# Patient Record
Sex: MALE | Race: WHITE | NOT HISPANIC OR LATINO
[De-identification: names, ages, dates, MRNs, and addresses within clinical notes are randomized per-mention and may not be internally consistent; named-entity substitution may affect disease eponyms.]

---

## 2021-02-24 ENCOUNTER — APPOINTMENT (OUTPATIENT)
Dept: OPHTHALMOLOGY | Facility: CLINIC | Age: 86
End: 2021-02-24
Payer: COMMERCIAL

## 2021-02-24 ENCOUNTER — NON-APPOINTMENT (OUTPATIENT)
Age: 86
End: 2021-02-24

## 2021-02-24 PROCEDURE — 92134 CPTRZ OPH DX IMG PST SGM RTA: CPT

## 2021-02-24 PROCEDURE — 99072 ADDL SUPL MATRL&STAF TM PHE: CPT

## 2021-02-24 PROCEDURE — 92014 COMPRE OPH EXAM EST PT 1/>: CPT

## 2021-08-21 ENCOUNTER — TRANSCRIPTION ENCOUNTER (OUTPATIENT)
Age: 86
End: 2021-08-21

## 2022-09-14 ENCOUNTER — RESULT REVIEW (OUTPATIENT)
Age: 87
End: 2022-09-14

## 2022-10-12 ENCOUNTER — APPOINTMENT (OUTPATIENT)
Dept: OPHTHALMOLOGY | Facility: CLINIC | Age: 87
End: 2022-10-12

## 2022-10-12 ENCOUNTER — NON-APPOINTMENT (OUTPATIENT)
Age: 87
End: 2022-10-12

## 2022-10-12 PROCEDURE — 92012 INTRM OPH EXAM EST PATIENT: CPT

## 2022-12-05 ENCOUNTER — APPOINTMENT (OUTPATIENT)
Dept: OPHTHALMOLOGY | Facility: CLINIC | Age: 87
End: 2022-12-05

## 2022-12-05 ENCOUNTER — NON-APPOINTMENT (OUTPATIENT)
Age: 87
End: 2022-12-05

## 2022-12-05 PROCEDURE — 92012 INTRM OPH EXAM EST PATIENT: CPT

## 2023-03-01 ENCOUNTER — APPOINTMENT (OUTPATIENT)
Dept: OPHTHALMOLOGY | Facility: CLINIC | Age: 88
End: 2023-03-01
Payer: COMMERCIAL

## 2023-03-01 ENCOUNTER — NON-APPOINTMENT (OUTPATIENT)
Age: 88
End: 2023-03-01

## 2023-03-01 PROCEDURE — 92134 CPTRZ OPH DX IMG PST SGM RTA: CPT

## 2023-03-01 PROCEDURE — 92014 COMPRE OPH EXAM EST PT 1/>: CPT

## 2023-04-04 PROBLEM — Z00.00 ENCOUNTER FOR PREVENTIVE HEALTH EXAMINATION: Status: ACTIVE | Noted: 2023-04-04

## 2024-06-05 ENCOUNTER — NON-APPOINTMENT (OUTPATIENT)
Age: 89
End: 2024-06-05

## 2024-06-05 ENCOUNTER — APPOINTMENT (OUTPATIENT)
Dept: OPHTHALMOLOGY | Facility: CLINIC | Age: 89
End: 2024-06-05
Payer: COMMERCIAL

## 2024-06-05 PROCEDURE — 92014 COMPRE OPH EXAM EST PT 1/>: CPT

## 2024-06-05 PROCEDURE — 92133 CPTRZD OPH DX IMG PST SGM ON: CPT

## 2024-06-17 ENCOUNTER — EMERGENCY (EMERGENCY)
Facility: HOSPITAL | Age: 89
LOS: 1 days | Discharge: HOME | End: 2024-06-17
Attending: EMERGENCY MEDICINE
Payer: COMMERCIAL

## 2024-06-17 VITALS
DIASTOLIC BLOOD PRESSURE: 77 MMHG | HEIGHT: 63 IN | HEART RATE: 76 BPM | OXYGEN SATURATION: 98 % | TEMPERATURE: 98 F | WEIGHT: 160.06 LBS | SYSTOLIC BLOOD PRESSURE: 149 MMHG | RESPIRATION RATE: 16 BRPM

## 2024-06-17 LAB
ALBUMIN SERPL ELPH-MCNC: 4.2 G/DL — SIGNIFICANT CHANGE UP (ref 3.3–5)
ALP SERPL-CCNC: 79 U/L — SIGNIFICANT CHANGE UP (ref 40–120)
ALT FLD-CCNC: 21 U/L — SIGNIFICANT CHANGE UP (ref 10–45)
ANION GAP SERPL CALC-SCNC: 13 MMOL/L — SIGNIFICANT CHANGE UP (ref 5–17)
APTT BLD: 30.4 SEC — SIGNIFICANT CHANGE UP (ref 24.5–35.6)
AST SERPL-CCNC: 29 U/L — SIGNIFICANT CHANGE UP (ref 10–40)
BASOPHILS # BLD AUTO: 0.04 K/UL — SIGNIFICANT CHANGE UP (ref 0–0.2)
BASOPHILS NFR BLD AUTO: 0.6 % — SIGNIFICANT CHANGE UP (ref 0–2)
BILIRUB SERPL-MCNC: 0.5 MG/DL — SIGNIFICANT CHANGE UP (ref 0.2–1.2)
BUN SERPL-MCNC: 14 MG/DL — SIGNIFICANT CHANGE UP (ref 7–23)
CALCIUM SERPL-MCNC: 9.5 MG/DL — SIGNIFICANT CHANGE UP (ref 8.4–10.5)
CHLORIDE SERPL-SCNC: 95 MMOL/L — LOW (ref 96–108)
CO2 SERPL-SCNC: 24 MMOL/L — SIGNIFICANT CHANGE UP (ref 22–31)
CREAT SERPL-MCNC: 1.15 MG/DL — SIGNIFICANT CHANGE UP (ref 0.5–1.3)
EGFR: 62 ML/MIN/1.73M2 — SIGNIFICANT CHANGE UP
EOSINOPHIL # BLD AUTO: 0.09 K/UL — SIGNIFICANT CHANGE UP (ref 0–0.5)
EOSINOPHIL NFR BLD AUTO: 1.4 % — SIGNIFICANT CHANGE UP (ref 0–6)
GLUCOSE SERPL-MCNC: 107 MG/DL — HIGH (ref 70–99)
HCT VFR BLD CALC: 42.1 % — SIGNIFICANT CHANGE UP (ref 39–50)
HGB BLD-MCNC: 14 G/DL — SIGNIFICANT CHANGE UP (ref 13–17)
IMM GRANULOCYTES NFR BLD AUTO: 0.5 % — SIGNIFICANT CHANGE UP (ref 0–0.9)
INR BLD: 0.96 RATIO — SIGNIFICANT CHANGE UP (ref 0.85–1.18)
LYMPHOCYTES # BLD AUTO: 1.83 K/UL — SIGNIFICANT CHANGE UP (ref 1–3.3)
LYMPHOCYTES # BLD AUTO: 27.5 % — SIGNIFICANT CHANGE UP (ref 13–44)
MCHC RBC-ENTMCNC: 27.8 PG — SIGNIFICANT CHANGE UP (ref 27–34)
MCHC RBC-ENTMCNC: 33.3 GM/DL — SIGNIFICANT CHANGE UP (ref 32–36)
MCV RBC AUTO: 83.7 FL — SIGNIFICANT CHANGE UP (ref 80–100)
MONOCYTES # BLD AUTO: 0.71 K/UL — SIGNIFICANT CHANGE UP (ref 0–0.9)
MONOCYTES NFR BLD AUTO: 10.7 % — SIGNIFICANT CHANGE UP (ref 2–14)
NEUTROPHILS # BLD AUTO: 3.96 K/UL — SIGNIFICANT CHANGE UP (ref 1.8–7.4)
NEUTROPHILS NFR BLD AUTO: 59.3 % — SIGNIFICANT CHANGE UP (ref 43–77)
NRBC # BLD: 0 /100 WBCS — SIGNIFICANT CHANGE UP (ref 0–0)
PLATELET # BLD AUTO: 187 K/UL — SIGNIFICANT CHANGE UP (ref 150–400)
POTASSIUM SERPL-MCNC: 3.4 MMOL/L — LOW (ref 3.5–5.3)
POTASSIUM SERPL-SCNC: 3.4 MMOL/L — LOW (ref 3.5–5.3)
PROT SERPL-MCNC: 6.9 G/DL — SIGNIFICANT CHANGE UP (ref 6–8.3)
PROTHROM AB SERPL-ACNC: 10.6 SEC — SIGNIFICANT CHANGE UP (ref 9.5–13)
RBC # BLD: 5.03 M/UL — SIGNIFICANT CHANGE UP (ref 4.2–5.8)
RBC # FLD: 13.7 % — SIGNIFICANT CHANGE UP (ref 10.3–14.5)
SODIUM SERPL-SCNC: 132 MMOL/L — LOW (ref 135–145)
TROPONIN T, HIGH SENSITIVITY RESULT: 21 NG/L — SIGNIFICANT CHANGE UP (ref 0–51)
TROPONIN T, HIGH SENSITIVITY RESULT: 21 NG/L — SIGNIFICANT CHANGE UP (ref 0–51)
WBC # BLD: 6.66 K/UL — SIGNIFICANT CHANGE UP (ref 3.8–10.5)
WBC # FLD AUTO: 6.66 K/UL — SIGNIFICANT CHANGE UP (ref 3.8–10.5)

## 2024-06-17 PROCEDURE — 70544 MR ANGIOGRAPHY HEAD W/O DYE: CPT | Mod: 26

## 2024-06-17 PROCEDURE — 72125 CT NECK SPINE W/O DYE: CPT | Mod: 26,MC

## 2024-06-17 PROCEDURE — 70553 MRI BRAIN STEM W/O & W/DYE: CPT | Mod: 26,MC

## 2024-06-17 PROCEDURE — 72128 CT CHEST SPINE W/O DYE: CPT | Mod: 26,MC

## 2024-06-17 PROCEDURE — 99291 CRITICAL CARE FIRST HOUR: CPT

## 2024-06-17 PROCEDURE — 70450 CT HEAD/BRAIN W/O DYE: CPT | Mod: 26,MC

## 2024-06-17 PROCEDURE — 70548 MR ANGIOGRAPHY NECK W/DYE: CPT | Mod: 26

## 2024-06-17 PROCEDURE — 71250 CT THORAX DX C-: CPT | Mod: 26,MC

## 2024-06-17 PROCEDURE — 99283 EMERGENCY DEPT VISIT LOW MDM: CPT

## 2024-06-17 PROCEDURE — 70450 CT HEAD/BRAIN W/O DYE: CPT | Mod: 26,MC,77

## 2024-06-17 RX ORDER — LIDOCAINE 4 G/100G
1 CREAM TOPICAL ONCE
Refills: 0 | Status: COMPLETED | OUTPATIENT
Start: 2024-06-17 | End: 2024-06-17

## 2024-06-17 RX ORDER — FAMOTIDINE 10 MG/ML
20 INJECTION INTRAVENOUS DAILY
Refills: 0 | Status: DISCONTINUED | OUTPATIENT
Start: 2024-06-17 | End: 2024-06-20

## 2024-06-17 RX ORDER — LEVOTHYROXINE SODIUM 125 MCG
50 TABLET ORAL DAILY
Refills: 0 | Status: DISCONTINUED | OUTPATIENT
Start: 2024-06-17 | End: 2024-06-20

## 2024-06-17 RX ORDER — ACETAMINOPHEN 500 MG
975 TABLET ORAL ONCE
Refills: 0 | Status: COMPLETED | OUTPATIENT
Start: 2024-06-17 | End: 2024-06-17

## 2024-06-17 RX ORDER — SPIRONOLACTONE 25 MG/1
25 TABLET, FILM COATED ORAL DAILY
Refills: 0 | Status: DISCONTINUED | OUTPATIENT
Start: 2024-06-17 | End: 2024-06-18

## 2024-06-17 RX ORDER — ACETAMINOPHEN 500 MG
650 TABLET ORAL ONCE
Refills: 0 | Status: COMPLETED | OUTPATIENT
Start: 2024-06-17 | End: 2024-06-17

## 2024-06-17 RX ORDER — DIAZEPAM 5 MG
2 TABLET ORAL ONCE
Refills: 0 | Status: DISCONTINUED | OUTPATIENT
Start: 2024-06-17 | End: 2024-06-17

## 2024-06-17 RX ORDER — LOSARTAN POTASSIUM 100 MG/1
50 TABLET, FILM COATED ORAL DAILY
Refills: 0 | Status: DISCONTINUED | OUTPATIENT
Start: 2024-06-17 | End: 2024-06-20

## 2024-06-17 RX ORDER — LEVETIRACETAM 250 MG/1
500 TABLET, FILM COATED ORAL
Refills: 0 | Status: DISCONTINUED | OUTPATIENT
Start: 2024-06-17 | End: 2024-06-20

## 2024-06-17 RX ADMIN — Medication 2 MILLIGRAM(S): at 14:32

## 2024-06-17 RX ADMIN — FAMOTIDINE 20 MILLIGRAM(S): 10 INJECTION INTRAVENOUS at 22:33

## 2024-06-17 RX ADMIN — SPIRONOLACTONE 25 MILLIGRAM(S): 25 TABLET, FILM COATED ORAL at 22:33

## 2024-06-17 RX ADMIN — LIDOCAINE 1 PATCH: 4 CREAM TOPICAL at 11:57

## 2024-06-17 RX ADMIN — LEVETIRACETAM 500 MILLIGRAM(S): 250 TABLET, FILM COATED ORAL at 11:29

## 2024-06-17 RX ADMIN — Medication 650 MILLIGRAM(S): at 23:40

## 2024-06-17 RX ADMIN — LEVETIRACETAM 500 MILLIGRAM(S): 250 TABLET, FILM COATED ORAL at 22:33

## 2024-06-17 RX ADMIN — LIDOCAINE 1 PATCH: 4 CREAM TOPICAL at 19:56

## 2024-06-17 RX ADMIN — Medication 975 MILLIGRAM(S): at 11:58

## 2024-06-17 RX ADMIN — LOSARTAN POTASSIUM 50 MILLIGRAM(S): 100 TABLET, FILM COATED ORAL at 22:33

## 2024-06-17 RX ADMIN — Medication 650 MILLIGRAM(S): at 22:40

## 2024-06-17 RX ADMIN — LIDOCAINE 1 PATCH: 4 CREAM TOPICAL at 21:20

## 2024-06-17 NOTE — ED PROVIDER NOTE - ATTENDING CONTRIBUTION TO CARE
Patient was critically ill with a high probability of imminent or life threatening deterioration.  I have performed direct patient care (not related to procedure), additional history taking, interpretation of diagnostic studies, documentation, consultation with other physicians, telephone consultation with the patient's family  I have personally and independently provided the amount of critical care time documented below excluding time spent on separate procedures.  32 mins    Dr. Berger: I have personally performed a face to face bedside history and physical examination of this patient. I have discussed the history, examination, review of systems, assessment and plan of management with the resident. I have reviewed the electronic medical record and amended it to reflect my history, review of systems, physical exam, assessment and plan.    Dr. Berger: This H&P has been written by myself in its entirety

## 2024-06-17 NOTE — CONSULT NOTE ADULT - ASSESSMENT
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 CTH w/ small posterior R parafalcine aSDH. No mass effect. Coags/plts wnl. Exam: Neuro-intact   -No acute neurosurgical intervention   -4h rpt CTH. If stable, no c/i to discharge from NSGY perspective   -ED keeping in CDU for syncope w/u, MRI   -Keppra 500mg BID x7d   -Outpatient f/u with Dr. Cevallos after d/c

## 2024-06-17 NOTE — ED ADULT NURSE NOTE - NSFALLHARMRISKINTERV_ED_ALL_ED
Assistance OOB with selected safe patient handling equipment if applicable/Communicate risk of Fall with Harm to all staff, patient, and family/Provide visual cue: red socks, yellow wristband, yellow gown, etc/Reinforce activity limits and safety measures with patient and family/Bed in lowest position, wheels locked, appropriate side rails in place/Call bell, personal items and telephone in reach/Instruct patient to call for assistance before getting out of bed/chair/stretcher/Non-slip footwear applied when patient is off stretcher/[place] to call system/Physically safe environment - no spills, clutter or unnecessary equipment/Purposeful Proactive Rounding/Room/bathroom lighting operational, light cord in reach

## 2024-06-17 NOTE — ED CDU PROVIDER DISPOSITION NOTE - PATIENT PORTAL LINK FT
You can access the FollowMyHealth Patient Portal offered by [facility] by registering at the following website: [email or web address]. By joining [facility]’s FollowMyHealth portal, you will also be able to view your health information using other applications (apps) compatible with our system.

## 2024-06-17 NOTE — ED CDU PROVIDER INITIAL DAY NOTE - CLINICAL SUMMARY MEDICAL DECISION MAKING FREE TEXT BOX
Dr. 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  Patient has a history of hypertension, took hydrochlorothiazide this morning.  Has a history of BPPV as well however this does not feel like it.  No chest pain, shortness of breath, nausea, vomiting, abdominal pain. Pt seen on arrival on EMS stretcher.  Gen: No acute distress  	HEENT: Mucous membranes moist, pink conjunctivae, EOMI, left-sided nystagmus noted  	CV: RRR, no clubbing/cyanosis/edema  	Resp: CTAB  	GI: Abdomen soft, NT, ND. Normal BS. No rebound, no guarding  	: No CVAT  	Neuro: A&O x 3, moving all 4 extremities, patient initially unsteady when ambulating however this resolved on its own.  Normal finger-to-nose and heel-to-shin bilaterally.  	MSK: No spine or joint tenderness to palpation  Skin: hematoma noted to the occiput  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  Last known well was 11 PM.  Code stroke called on arrival, taken to head CT, patient states he is anaphylactic to contrast thus just Noncon CT performed.  Discussed with neurology at bedside, recommend CDU for MRI versus outpatient follow-up.  Patient not comfortable with outpatient follow-up for etiology of unsteadiness, will place in CDU for MRI.  Patient also noted to have a small subdural hemorrhage, discussed with neurosurgery at bedside, recommends 4-hour head CT and then can be cleared from neurosurgery standpoint.  Also recommends Keppra 500 mg twice daily for a week and blood pressure can be maintained under 180.  Discussed with CDU BRODIE Rehman, will place in the CDU for MRI. CT head stable.

## 2024-06-17 NOTE — ED CDU PROVIDER INITIAL DAY NOTE - PHYSICAL EXAMINATION
Gen: No acute distress  HEENT: Mucous membranes moist, pink conjunctivae, EOMI.  CV: RRR, no clubbing/cyanosis/edema  Resp: CTAB  GI: Abdomen soft, NT, ND. Normal BS. No rebound, no guarding  : No CVAT  Neuro: A&O x 3, steady gait. No focal deficits.   MSK: Moving all extremities.  Skin: hematoma noted to the occiput

## 2024-06-17 NOTE — ED CDU PROVIDER DISPOSITION NOTE - CLINICAL COURSE
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  Patient has a history of hypertension, took hydrochlorothiazide this morning.  Has a history of BPPV as well however this does not feel like it.  No chest pain, shortness of breath, nausea, vomiting, or abdominal pain.   In ED, pt hypertensive, vitals otherwise stable. Labs nonactionable. CTH + SDH. Patient seen by NSGY recommending repeat 4hr CTH which was stable.  Pt also seen by neuro recommending MRI/MRA for workup of unsteadiness. Plan as above for CDU.   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  Patient has a history of hypertension, took hydrochlorothiazide this morning.  Has a history of BPPV as well however this does not feel like it.  No chest pain, shortness of breath, nausea, vomiting, or abdominal pain.   In ED, pt hypertensive, vitals otherwise stable. Labs nonactionable. CTH + SDH. Patient seen by NSGY recommending repeat 4hr CTH which was stable.  Pt also seen by neuro recommending MRI/MRA for workup of unsteadiness. Plan as above for CDU.   In CDU, pt did well, sdh stable, MRI without emergent/acute findings. pt seen by neuro and neurosurgeon and cleared. pt going home on [facility].

## 2024-06-17 NOTE — ED CDU PROVIDER DISPOSITION NOTE - CARE PROVIDER_API CALL
Yosef Cevallos  Neurosurgery  [address], [address]  [place] [address]  Phone: [phone]  Fax: [phone]  Follow Up Time:

## 2024-06-17 NOTE — ED ADULT NURSE NOTE - OBJECTIVE STATEMENT
PT is 89y M with PMH HTN, complaining of fall. Pt reports onset of "unsteadiness" in gait upon waking up this morning. Able to ambulate to run errands, but reports falling backwards from standing height and + head strike, - LOC. Able to stand after fall, but persistent unsteadiness since. Upon assessment, A&Ox4, approximately 4 cm hematoma and abrasion on R posterior head, PERRLA 2mm, denies chest pain, SOB, abdominal pain, n/v.

## 2024-06-17 NOTE — ED PROVIDER NOTE - OBJECTIVE STATEMENT
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  Patient has a history of hypertension, took hydrochlorothiazide this morning.  Has a history of BPPV as well however this does not feel like it.  No chest pain, shortness of breath, nausea, vomiting, abdominal pain. Pt seen on arrival on EMS stretcher.

## 2024-06-17 NOTE — ED PROVIDER NOTE - FAMILY DETAILS FREE TEXT FOR MDM ADDL HISTORY OBTAINED FROM QUESTION
wife at bedsideGen: No acute distress  HEENT: Mucous membranes moist, pink conjunctivae, EOMI, left-sided nystagmus noted  CV: RRR, no clubbing/cyanosis/edema  Resp: CTAB  GI: Abdomen soft, NT, ND. Normal BS. No rebound, no guarding  : No CVAT  Neuro: A&O x 3, moving all 4 extremities, patient initially unsteady when ambulating however this resolved on its own.  Normal finger-to-nose and heel-to-shin bilaterally.  MSK: No spine or joint tenderness to palpation  Skin: hematoma noted to the occiput wife at bedside

## 2024-06-17 NOTE — ED CDU PROVIDER INITIAL DAY NOTE - OBJECTIVE STATEMENT
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  Patient has a history of hypertension, took hydrochlorothiazide this morning.  Has a history of BPPV as well however this does not feel like it.  No chest pain, shortness of breath, nausea, vomiting, or abdominal pain.   In ED, pt hypertensive, vitals otherwise stable. Labs nonactionable. CTH + SDH. Patient seen by NSGY recommending repeat 4hr CTH which was stable.  Pt also seen by neuro recommending MRI for workup of unsteadiness. Plan as above for CDU.

## 2024-06-17 NOTE — CONSULT NOTE ADULT - SUBJECTIVE AND OBJECTIVE BOX
Neurology - Consult Note    -  Spectra: [phone] ([facility]), [phone] ([facility])  -    HPI: Patient DrIlana BARRAGAN is a 89y (1935) man with a PMHx significant for HTN (well controlled), migraine, BPPV, osteoarthritis, s/p thyroidectomy, ?ITP who presented to the ED for gait unsteadiness and fall. Patient stated yesterday patient had a typical migrainous type R sided headache during the day which resolved and went to bed at neurologial baseline. Upon awakening patient endorsed non specific gait unsteadiness. Denied leaning toward one side vs the other, LH, room spinning dizziness. He proceeded to complete his PT session without difficulty. While waking uphill into his home and holding a package he suffered a fall (fell backwards) with headstrike but no LOC. Fall not preceeded by  LH or room spinning dizziness. No seizure like activity noted. He then presented to the ED and code stroke activated. CTH resulted in small SDH along falx w/o midline shift. CT c spine neg for spinal fracture. Unable to obtain vessel imaging d/t prior anaphylactic rxn to IV contrast.     Patient denied HA, vision or speech changes, weakness, numbness. Feels his gait is baseline. No memory impairment. Feels at neurological baseline. Does endorse back pain (stated he feel on his thoracic spine).   NIHSS: 0  ICH 0   Baseline mRS: 0   Not a tenecteplase candidate due to presentation outside the window and SDH    Patient is a retired Internist as a profession     Review of Systems:    CONSTITUTIONAL: No fevers or chills  EYES AND ENT: No visual changes or no throat pain   NECK: No pain or stiffness  RESPIRATORY: No hemoptysis or shortness of breath  CARDIOVASCULAR: No chest pain or palpitations  GASTROINTESTINAL: No melena or hematochezia  GENITOURINARY: No dysuria or hematuria  NEUROLOGICAL: +As stated in HPI above  SKIN: No itching, burning, rashes, or lesions   All other review of systems is negative unless indicated above.    Allergies:  Motrin (Rash)      PMHx/PSHx/Family Hx: As above, otherwise see below   HTN (hypertension)        Social Hx:  No reported use of tobacco, alcohol, or illicit drugs    Medications:  MEDICATIONS  (STANDING):  levETIRAcetam 500 milliGRAM(s) Oral two times a day    MEDICATIONS  (PRN):        Home Medications:      Vitals:  T(C): 36.7 (06-17-24 @ 11:00), Max: 36.7 (06-17-24 @ 10:40)  HR: 71 (06-17-24 @ 11:00) (70 - 76)  BP: 158/105 (06-17-24 @ 11:00) (149/77 - 164/81)  RR: 16 (06-17-24 @ 11:00) (16 - 20)  SpO2: 98% (06-17-24 @ 11:00) (98% - 98%)    Physical Examination:    General - NAD  Cardiovascular - Peripheral pulses palpable, no edema    Neurologic Exam:  Mental status - Awake, Alert, Oriented to person, place, and time. Speech fluent, repetition and naming intact. Follows simple and complex commands. attention, concentration and fund of knowledge intact.     Cranial nerves:  CN II: Visual fields are full to confrontation. Pupils are 3 mm and briskly reactive to light.   CN III, IV, VI: EOMI, no nystagmus, no ptosis  CN V: Facial sensation is intact to pinprick in all 3 divisions bilaterally.  CN VII: Face is symmetric with normal eye closure and smile.  CN VII: Hearing is normal to rubbing fingers  CN IX, X: Palate elevates symmetrically. Phonation is normal.  CN XI: Head turning and shoulder shrug are intact  CN XII: Tongue is midline with normal movements and no atrophy.    Motor - Normal bulk and tone throughout. No pronator drift.  Strength testing            Deltoid      Biceps      Triceps     Wrist Extension    Wrist Flexion       R            5                 5               5                     5                              5                        5  L             5                 5               5                     5                              5                       5              Hip Flexion    Hip Extension    Knee Flexion    Knee Extension    Dorsiflexion    Plantar Flexion  R              5                           5                       5                           5                            5                          5  L              5                           5                        5                           5                            5                          5      Sensation - Light touch/temperature intact throughout    DTR's -             Biceps      Triceps     Brachioradialis      Patellar    Ankle    Toes/plantar response  R             2+             2+                  2+                       2+            2+                 Down  L              2+             2+                 2+                        2+           2+                 Down    Coordination - Finger to Nose and heal to shin intact b/l. No tremors appreciated    Gait and station - Normal casual gait. Romberg (-)    Labs:                        14.0   6.66  )-----------( 187      ( 17 Jun 2024 10:08 )             42.1     06-17    132<L>  |  95<L>  |  14  ----------------------------<  107<H>  3.4<L>   |  24  |  1.15    Ca    9.5      17 Jun 2024 10:08    TPro  6.9  /  Alb  4.2  /  TBili  0.5  /  DBili  x   /  AST  29  /  ALT  21  /  AlkPhos  79  06-17    CAPILLARY BLOOD GLUCOSE  108 (17 Jun 2024 11:17)      POCT Blood Glucose.: 108 mg/dL (17 Jun 2024 10:01)    LIVER FUNCTIONS - ( 17 Jun 2024 10:08 )  Alb: 4.2 g/dL / Pro: 6.9 g/dL / ALK PHOS: 79 U/L / ALT: 21 U/L / AST: 29 U/L / GGT: x             PT/INR - ( 17 Jun 2024 10:08 )   PT: 10.6 sec;   INR: 0.96 ratio         PTT - ( 17 Jun 2024 10:08 )  PTT:30.4 sec           Radiology:    < from: CT Cervical Spine No Cont (06.17.24 @ 10:35) >  IMPRESSION:  CT head:  -Acute subdural hematoma measuring up to 0.4 cm in thickness along the   posterior falx. No midline shift.    CT cervical spine:  -No acute fracture or dislocation.    < end of copied text >   Neurology - Consult Note    -  Spectra: [phone] ([facility]), [phone] ([facility])  -    HPI: Patient DrIlana BARRAGAN is a 89y (1935) man with a PMHx significant for HTN (well controlled), migraine, BPPV, osteoarthritis, s/p thyroidectomy, ?ITP who presented to the ED for gait unsteadiness and fall. Patient stated yesterday patient had a typical migrainous type R sided headache during the day which resolved and went to bed at neurologial baseline. Upon awakening patient endorsed non specific gait unsteadiness. Denied leaning toward one side vs the other, LH, room spinning dizziness. He is not on any AC or anti-thrombotics.  He proceeded to complete his PT session without difficulty. While waking uphill into his home and holding a package he suffered a fall (fell backwards) with headstrike but no LOC. Fall not preceeded by  LH or room spinning dizziness. No seizure like activity noted. He then presented to the ED and code stroke activated. CTH resulted in small SDH along falx w/o midline shift. CT c spine neg for spinal fracture. Unable to obtain vessel imaging d/t prior anaphylactic rxn to IV contrast.     Patient denied HA, vision or speech changes, weakness, numbness. Feels his gait is baseline. No memory impairment. Feels at neurological baseline. Does endorse back pain (stated he feel on his thoracic spine).   NIHSS: 0  ICH 0   Baseline mRS: 0   Not a tenecteplase candidate due to presentation outside the window and SDH    Patient is a retired Internist as a profession     Review of Systems:    CONSTITUTIONAL: No fevers or chills  EYES AND ENT: No visual changes or no throat pain   NECK: No pain or stiffness  RESPIRATORY: No hemoptysis or shortness of breath  CARDIOVASCULAR: No chest pain or palpitations  GASTROINTESTINAL: No melena or hematochezia  GENITOURINARY: No dysuria or hematuria  NEUROLOGICAL: +As stated in HPI above  SKIN: No itching, burning, rashes, or lesions   All other review of systems is negative unless indicated above.    Allergies:  Motrin (Rash)      PMHx/PSHx/Family Hx: As above, otherwise see below   HTN (hypertension)        Social Hx:  No reported use of tobacco, alcohol, or illicit drugs    Medications:  MEDICATIONS  (STANDING):  levETIRAcetam 500 milliGRAM(s) Oral two times a day    MEDICATIONS  (PRN):        Home Medications:      Vitals:  T(C): 36.7 (06-17-24 @ 11:00), Max: 36.7 (06-17-24 @ 10:40)  HR: 71 (06-17-24 @ 11:00) (70 - 76)  BP: 158/105 (06-17-24 @ 11:00) (149/77 - 164/81)  RR: 16 (06-17-24 @ 11:00) (16 - 20)  SpO2: 98% (06-17-24 @ 11:00) (98% - 98%)    Physical Examination:    General - NAD  Cardiovascular - Peripheral pulses palpable, no edema    Neurologic Exam:  Mental status - Awake, Alert, Oriented to person, place, and time. Speech fluent, repetition and naming intact. Follows simple and complex commands. attention, concentration and fund of knowledge intact.     Cranial nerves:  CN II: Visual fields are full to confrontation. Pupils are 3 mm and briskly reactive to light.   CN III, IV, VI: EOMI, no nystagmus, no ptosis  CN V: Facial sensation is intact to pinprick in all 3 divisions bilaterally.  CN VII: Face is symmetric with normal eye closure and smile.  CN VII: Hearing is normal to rubbing fingers  CN IX, X: Palate elevates symmetrically. Phonation is normal.  CN XI: Head turning and shoulder shrug are intact  CN XII: Tongue is midline with normal movements and no atrophy.    Motor - Normal bulk and tone throughout. No pronator drift.  Strength testing            Deltoid      Biceps      Triceps     Wrist Extension    Wrist Flexion       R            5                 5               5                     5                              5                        5  L             5                 5               5                     5                              5                       5              Hip Flexion    Hip Extension    Knee Flexion    Knee Extension    Dorsiflexion    Plantar Flexion  R              5                           5                       5                           5                            5                          5  L              5                           5                        5                           5                            5                          5      Sensation - Light touch/temperature intact throughout    DTR's -             Biceps      Triceps     Brachioradialis      Patellar    Ankle    Toes/plantar response  R             2+             2+                  2+                       2+            2+                 Down  L              2+             2+                 2+                        2+           2+                 Down    Coordination - Finger to Nose and heal to shin intact b/l. No tremors appreciated    Gait and station - Normal casual gait. Romberg (-)    Labs:                        14.0   6.66  )-----------( 187      ( 17 Jun 2024 10:08 )             42.1     06-17    132<L>  |  95<L>  |  14  ----------------------------<  107<H>  3.4<L>   |  24  |  1.15    Ca    9.5      17 Jun 2024 10:08    TPro  6.9  /  Alb  4.2  /  TBili  0.5  /  DBili  x   /  AST  29  /  ALT  21  /  AlkPhos  79  06-17    CAPILLARY BLOOD GLUCOSE  108 (17 Jun 2024 11:17)      POCT Blood Glucose.: 108 mg/dL (17 Jun 2024 10:01)    LIVER FUNCTIONS - ( 17 Jun 2024 10:08 )  Alb: 4.2 g/dL / Pro: 6.9 g/dL / ALK PHOS: 79 U/L / ALT: 21 U/L / AST: 29 U/L / GGT: x             PT/INR - ( 17 Jun 2024 10:08 )   PT: 10.6 sec;   INR: 0.96 ratio         PTT - ( 17 Jun 2024 10:08 )  PTT:30.4 sec           Radiology:    < from: CT Cervical Spine No Cont (06.17.24 @ 10:35) >  IMPRESSION:  CT head:  -Acute subdural hematoma measuring up to 0.4 cm in thickness along the   posterior falx. No midline shift.    CT cervical spine:  -No acute fracture or dislocation.    < end of copied text >

## 2024-06-17 NOTE — CONSULT NOTE ADULT - ASSESSMENT
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 Patient stated yesterday patient had a typical migrainous type R sided headache during the day which resolved and went to bed at neurologial baseline. Upon awakening patient endorsed non specific gait unsteadiness.  While waking uphill into his home and holding a package he suffered a fall (fell backwards) with headstrike but no LOC. Fall not preceeded by  LH or room spinning dizziness. No seizure like activity noted. He then presented to the ED and code stroke activated. CTH resulted in small SDH along falx w/o midline shift. CT c spine neg for spinal fracture. Feels at neurological baseline. Does endorse back pain (stated he feel on his thoracic spine).  Patient would like to obtain MRI to ensure no CNS pathology.   NIHSS: 0  ICH 0   Baseline mRS: 0   Not a tenecteplase candidate due to presentation outside the window and SDH      Impression: Resolved unsteady gait of unclear etiology. S/p fall resulting in traumatic SDH. Etiology at this time unclear however based upon nature of fall and non lateralizing examination would have a low suspicion for primary neurovascular etiology however unable to definitively rule out as does have prior vascular risk factors. Would consider alternative including but not limited to mechanical fall, hypovolemia, toxic/metabolic etiologies.     Recommendations:     For traumatic SDH, will defer to neurosurgery for acute management. Discussed with NSG team whom recommended interval CTH 4 hrs. Will also defer neurochecks and BP goals to NSG  Patient currently scheduled for CDU for MRI brain w/o contrast and MRA H/N after interdisciplinary discussion with ED attending and NSG resident.  Will follow up on above imaging.    Upon discharge, patient may follow up with vascular neurology at [address] [place] [address] 1-2 weeks after discharge. Please instruct the patient to call [phone] to schedule this appointment.     Patient case discussed with stroke fellow Dr. Carlos Newsome under the supervision of stroke attending Dr. Case Palomo as well as Dr. Palomo himself.     Final recommendations regarding management to be confirmed upon attending attestation.        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 Patient stated yesterday patient had a typical migrainous type R sided headache during the day which resolved and went to bed at neurologial baseline. Upon awakening patient endorsed non specific gait unsteadiness.  While waking uphill into his home and holding a package he suffered a fall (fell backwards) with headstrike but no LOC. Fall not preceeded by  LH or room spinning dizziness. No seizure like activity noted. He then presented to the ED and code stroke activated. CTH resulted in small SDH along falx w/o midline shift. CT c spine neg for spinal fracture. Feels at neurological baseline. Does endorse back pain (stated he feel on his thoracic spine).  Patient would like to obtain MRI to ensure no CNS pathology. He is not on any AC or anti-thrombotics.   NIHSS: 0  ICH 0   Baseline mRS: 0   Not a tenecteplase candidate due to presentation outside the window and SDH      Impression: Resolved unsteady gait of unclear etiology. S/p fall resulting in traumatic SDH. Etiology at this time unclear however based upon nature of fall and non lateralizing examination would have a low suspicion for primary neurovascular etiology however unable to definitively rule out as does have prior vascular risk factors. Would consider alternative including but not limited to mechanical fall, hypovolemia, toxic/metabolic etiologies.     Recommendations:     For traumatic SDH, will defer to neurosurgery for acute management. Discussed with NSG team whom recommended interval CTH 4 hrs. Will also defer neurochecks and BP goals to NSG  Patient currently scheduled for CDU for MRI brain w/o contrast and MRA H/N after interdisciplinary discussion with ED attending and NSG resident.  Will follow up on above imaging.    Upon discharge, patient may follow up with vascular neurology at [address] [place] [address] 1-2 weeks after discharge. Please instruct the patient to call [phone] to schedule this appointment.     Patient case discussed with stroke fellow Dr. Carlos Newsome under the supervision of stroke attending Dr. Case Palomo as well as Dr. Palomo himself.     Final recommendations regarding management to be confirmed upon attending attestation.

## 2024-06-17 NOTE — ED ADULT NURSE NOTE - NSFALLLASTDATE_ED_ALL_ED_DT
[FreeTextEntry1] : I had a pleasure of seeing Ms. AGARWAL for initial consultation for Atrial Flutter. \par \par Ms. AGARWAL is a 76 year-year old female with history of CAD/CABG, HTN, Anxiety, atrial flutter is here for Atrial Flutter. 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 \par + Palpitations at home- short lasting.\par \par 2/23: Feels better.\par 3/22: Feels ok. No episodes. Wants to stop all meds.\par 4/27: s/p AFL ablation. Feels great. Plan to go for cataract surgery in 2 weeks.\par \par Denies chest pain, shortness of breath, palpitation, dizziness or LOC except noted above.\par \par EKG (03/22): SR\par EKG (2/23/22): SR@84, QRSd 86\par EKG (11/18/21): AFL @ 123, QRSd 86\par TTE (10/25/21): EF 55-60%, Mild LAE, Mod to severe MR, Mod TR\par MIKAEL (11/21): Nl EF, Mod central MR, Mod TR 17-Jun-2024 09:00

## 2024-06-17 NOTE — ED CDU PROVIDER DISPOSITION NOTE - ATTENDING APP SHARED VISIT CONTRIBUTION OF CARE
Attending Note -- Pasha d/w CDU PA.  Patient seen and evaluated in CDU.  Labs/imaging reviewed.  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  Patient is not anticoagulated.  Patient had a steady gait with a completely nonfocal and reassuring examination with me this morning.  Neurology was consulted for questionable concerns regarding gait with toe no obvious need for inpatient evaluation based on my examination.  He may benefit from physical therapy recommendation appropriate refer to neurology.  Presuming no other plan to discharge presuming nonactionable clinical course and no further recommendations per consulting service --Reese Harkins MD, Attending Physician

## 2024-06-17 NOTE — ED CDU PROVIDER INITIAL DAY NOTE - PROGRESS NOTE DETAILS
Spoke with neurology, confirm recs for MRI brain without, MRA head without, MRA neck with contrast. No need for TTE at this time. CDU PROGRESS NOTE BRODIE CALVERT: Received pt at 1900 sign-out. Case/plan reviewed. 4hr repeat Head CT showed There is mild subdural blood at the interhemispheric fissure, unchanged. No interval rebleeding. Pt currently resting in stretcher in NAD. VSS. Pt is aox3, speaking coherently, no focal neuro deficits. S1 S2 noted, RRR, lungs CTA b/l, BS x4 with soft, nontender abdomen. Pt without complaints. Will continue to monitor, plan for MRI/MRA. nsgy and neurology recs appreciated. Keflex 500mg bid. MRI completed, pending read. CDU PROGRESS NOTE BRODIE CALVERT: Received pt at 1900 sign-out. Case/plan reviewed. 4hr repeat Head CT showed There is mild subdural blood at the interhemispheric fissure, unchanged. No interval rebleeding. Pt currently resting in stretcher in NAD. VSS. Pt is aox3, speaking coherently, no focal neuro deficits. S1 S2 noted, RRR, lungs CTA b/l, BS x4 with soft, nontender abdomen. Pt without complaints. Will continue to monitor, plan for MRI/MRA. nsgy and neurology recs appreciated. Keppra 500mg bid.

## 2024-06-17 NOTE — ED CDU PROVIDER INITIAL DAY NOTE - ATTENDING APP SHARED VISIT CONTRIBUTION OF CARE
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 I completed an independent physical exam.  I have discussed patient's plan of care with JULIUS.   I agree with note as stated above, having amended the EMR as needed to reflect my findings.   This includes HISTORY OF PRESENT ILLNESS, HIV, PAST MEDICAL/SURGICAL/FAMILY/SOCIAL HISTORY, ALLERGIES AND HOME MEDICATIONS, REVIEW OF SYSTEMS, PHYSICAL EXAM, and any PROGRESS NOTES during the time I functioned as the attending physician for this patient.    see mdm

## 2024-06-17 NOTE — ED CDU PROVIDER DISPOSITION NOTE - NSFOLLOWUPINSTRUCTIONS_ED_ALL_ED_FT
Rest. Keep self well hydrated. Continue home medications as prescribed.       Follow up with your primary care provider and Neurologist,  -- in 24-48 hours. Take copy of your printed results with you to your appointment.     MAGALY f/u ? --    Stroke education packet was given to you for further information.    Return to ER for any weakness, dizziness, numbness/tingling, change in speech or vision, problems walking or any other concerns. 1. stay hydrated.  2. continue current home medications. Do not take Aspirin/naproxen/aleve/ibuprofen. Take Keppra 500mg 2x/day.   3. Follow up with your PCP in 1 -2 days.  4. Follow up with your Neurologist or Neurology at#[phone] within 3-5 days. Follow up with Neurosurgeon Dr. Cevallos within 1 week.    Yosef Cevallos  Neurosurgery  [address] [place] [address], [address]  [place] [address]  Phone: [phone]  Fax: [phone]      5. Bring Printed Results to your Doctor Visits.   6. Return if symptoms worsen, fever, weakness, numbness, dizziness, vision change, slurred speech and all other concerns      Follow up the following with your doctors:  low potassium  low sodium  low chloride  MRI results: "  4.  ANTERIOR INTRACRANIAL CIRCULATION:     Intracranial atherosclerosis   cavernous and clinoid segments of the internal carotid arteries, mild.    5.  POSTERIOR INTRACRANIAL CIRCULATION:   Intracranial atherosclerosis   right posterior cerebral artery, moderate.    6.  BRAIN:   No evidence of acute infarction. No pathologic enhancement.     Right posterior falx small subdural hematoma, not substantially changed   from earlier same day possibly smaller. Ischemic white matter disease and   atrophy typical for age. Small right parietal calvarial lesion likely   benign"    Subdural Hematoma  Cross-section of the brain with a close-up showing a collection of blood between the brain and its outer covering, or dura.  A subdural hematoma is a collection of blood between the brain and its outer covering (dura). As the amount of blood increases, pressure builds on the brain.    There are two types of subdural hematomas:  Acute. This type develops shortly after a hard, direct hit to the head and causes blood to collect very quickly. This is a medical emergency. If it is not diagnosed and treated quickly, it can lead to severe brain injury or death.  Chronic. This is when bleeding develops more slowly, over weeks or months. In some cases, this type does not cause symptoms.  What are the causes?  This condition is caused by bleeding from a broken blood vessel (hemorrhage). In most cases, this is because of a head injury, such as from a hard, direct hit.  Head injuries can be caused by:  Motor vehicle accidents.  Falls.  Assaults.  Sports injuries.  In rare cases, a hemorrhage can happen without a known cause, especially if you take blood thinners (anticoagulants).    What increases the risk?  This condition is more likely to develop in:  Older people.  Infants.  People who take blood thinners.  People who have head injuries.  People who abuse alcohol.  What are the signs or symptoms?  Symptoms of this condition can be mild, severe, or life-threatening, depending on the size of the hematoma.    Symptoms of this condition include:  Headache.  Nausea or vomiting.  Changes in vision, such as double vision or loss of vision.  Changes in speech or trouble understanding what people say.  Loss of balance or trouble walking.  Weakness, numbness, or tingling in the arms or legs, especially on one side of the body.  Seizures.  Change in personality.  Increased sleepiness.  Memory loss.  Loss of consciousness.  Coma.  Symptoms of acute subdural hematoma can develop over minutes or hours. Symptoms of chronic subdural hematoma may develop over weeks or months.    How is this diagnosed?  This condition is diagnosed based on:  A physical exam.  Tests of strength, reflexes, coordination, senses, manner of walking, and facial and eye movements (neurological exam).  Imaging tests, such as an MRI or CT scan.  How is this treated?  Treatment for this condition depends on the type of hematoma and how severe it is.    Treatment for acute hematoma may include:  Emergency surgery to drain blood or remove a blood clot.  Medicines that help the body get rid of excess fluids (diuretics). These may help reduce pressure in the brain.  Assisted breathing (ventilation).  Treatment for chronic hematoma may include:  Observation and bed rest at the hospital.  Surgery.  If you take blood thinners, you may need to stop taking them for a short time. You may also be given anti-seizure medicine.    Sometimes, no treatment is needed for chronic hematoma.    Follow these instructions at home:  Activity    Avoid situations where you could injure your head again, such as competitive sports, downhill snow sports, and horseback riding. Do not do these activities until your health care provider approves.  Wear protective gear, such as a helmet, when participating in activities such as biking or contact sports.  Always wear your seat belt when you are in a motor vehicle.  Rest as told by your health care provider. Rest helps the brain heal.  You may have to avoid lifting. Ask your health care provider how much you can safely lift.  Do not drive, ride a bike, or use machinery until your health care provider says that it is safe.  Avoid too much visual stimulation while recovering. This includes working on the computer, watching TV, and reading.  Try to avoid activities that cause physical or mental stress.  Return to your normal activities as told by your health care provider. Ask your health care provider what activities are safe for you.  Alcohol use    Do not drink alcohol if:  Your health care provider tells you not to drink.  You are pregnant, may be pregnant, or are planning to become pregnant.  If you drink alcohol:  Limit how much you have to:  0–1 drink a day for women.  0–2 drinks a day for men.  Know how much alcohol is in your drink. In the [place] one drink equals one 12 oz bottle of beer (355 mL), one 5 oz glass of wine (148 mL), or one 1½ oz glass of hard liquor (44 mL).  General instructions    Watch your symptoms and ask others to do the same. Recovery from brain injuries varies. Talk with your health care provider about what to expect.  Take over-the-counter and prescription medicines only as told by your health care provider. Do not take blood thinners or NSAIDs unless your health care provider approves. These include aspirin, ibuprofen, naproxen, and warfarin.  Keep your home environment safe to reduce the risk of falling.  Keep all follow-up visits. Your health care team may need to watch you over time to check for serious changes in your condition.  Where to find more information  [employer] [place]: www.biausa.org  Brain Trauma Foundation: www.braintrauma.org  Get help right away if:  You are taking blood thinners or have a bleeding disorder and fall or experience minor trauma to the head. If you take blood thinners, even a small injury can cause a subdural hematoma.  You develop any of these symptoms after a head injury:  Clear fluid draining from your nose or ears.  Nausea or vomiting.  Changes in speech or trouble understanding what people say.  Seizures.  Sleepiness or a decrease in alertness.  Double vision.  Numbness or inability to move in any part of your body.  Difficulty walking or poor coordination.  Difficulty thinking.  Confusion or forgetfulness.  Personality changes.  Irrational or aggressive behavior.  These symptoms may be an emergency. Get help right away. Call 911.  Do not wait to see if the symptoms will go away.  Do not drive yourself to the hospital.  Summary  A subdural hematoma is a collection of blood between the brain and its outer covering (dura).  Treatment for this condition depends on the type of subdural hematoma and how severe it is.  Symptoms can vary from mild to severe to life-threatening.  Watch your symptoms and ask others to do the same.  This information is not intended to replace advice given to you by your health care provider. Make sure you discuss any questions you have with your health care provider.    Document Revised: 03/13/2023 Document Reviewed: 03/13/2023  [employer] Patient Education © 2024 [employer]  [employer] logo  Terms and Conditions  Privacy Policy  Editorial Policy  All content on this site: Copyright © 2024 Elsevier, its licensors, and contributors. All rights are reserved, including those for text and data mining, AI training, and similar technologies. For all open access content, the Creative Commons licensing terms apply.  Cookies are used by this site. To decline or learn more, visit our Cookies page.

## 2024-06-17 NOTE — ED PROVIDER NOTE - PHYSICAL EXAMINATION
Gen: No acute distress  HEENT: Mucous membranes moist, pink conjunctivae, EOMI, left-sided nystagmus noted  CV: RRR, no clubbing/cyanosis/edema  Resp: CTAB  GI: Abdomen soft, NT, ND. Normal BS. No rebound, no guarding  : No CVAT  Neuro: A&O x 3, moving all 4 extremities, patient initially unsteady when ambulating however this resolved on its own.  Normal finger-to-nose and heel-to-shin bilaterally.  MSK: No spine or joint tenderness to palpation  Skin: hematoma noted to the occiput

## 2024-06-17 NOTE — ED PROVIDER NOTE - CLINICAL SUMMARY MEDICAL DECISION MAKING FREE TEXT BOX
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  Last known well was 11 PM.  Code stroke called on arrival, taken to head CT, patient states he is anaphylactic to contrast thus just Noncon CT performed.  Discussed with neurology at bedside, recommend CDU for MRI versus outpatient follow-up.  Patient not comfortable with outpatient follow-up for etiology of unsteadiness, will place in CDU for MRI.  Patient also noted to have a small subdural hemorrhage, discussed with neurosurgery at bedside, recommends 4-hour head CT and then can be cleared from neurosurgery standpoint.  Also recommends Keppra 500 mg twice daily for a week and blood pressure can be maintained under 180.  Discussed with CDU BRODIE Rehman, will place in the CDU for MRI. Pt also c/o upper thoracic pain, will get CT thoracic spine.

## 2024-06-17 NOTE — CONSULT NOTE ADULT - SUBJECTIVE AND OBJECTIVE BOX
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 CTH w/ small posterior R parafalcine aSDH. No mass effect. Coags/plts wnl.     Exam: Neuro-intact       --Anticoagulation:    =====================  PAST MEDICAL HISTORY   HTN (hypertension)      PAST SURGICAL HISTORY     Motrin (Rash)      MEDICATIONS:  Antibiotics:    Neuro:  levETIRAcetam 500 milliGRAM(s) Oral two times a day    Other:      SOCIAL HISTORY:   Occupation:   Marital Status:     FAMILY HISTORY:      ROS: Negative except per HPI    LABS:  PT/INR - ( 17 Jun 2024 10:08 )   PT: 10.6 sec;   INR: 0.96 ratio         PTT - ( 17 Jun 2024 10:08 )  PTT:30.4 sec                        14.0   6.66  )-----------( 187      ( 17 Jun 2024 10:08 )             42.1     06-17    132<L>  |  95<L>  |  14  ----------------------------<  107<H>  3.4<L>   |  24  |  1.15    Ca    9.5      17 Jun 2024 10:08    TPro  6.9  /  Alb  4.2  /  TBili  0.5  /  DBili  x   /  AST  29  /  ALT  21  /  AlkPhos  79  06-17

## 2024-06-18 VITALS
RESPIRATION RATE: 16 BRPM | SYSTOLIC BLOOD PRESSURE: 151 MMHG | OXYGEN SATURATION: 98 % | HEART RATE: 67 BPM | DIASTOLIC BLOOD PRESSURE: 81 MMHG | TEMPERATURE: 99 F

## 2024-06-18 LAB
A1C WITH ESTIMATED AVERAGE GLUCOSE RESULT: 5.6 % — SIGNIFICANT CHANGE UP (ref 4–5.6)
CHOLEST SERPL-MCNC: 126 MG/DL — SIGNIFICANT CHANGE UP
ESTIMATED AVERAGE GLUCOSE: 114 MG/DL — SIGNIFICANT CHANGE UP (ref 68–114)
HDLC SERPL-MCNC: 50 MG/DL — SIGNIFICANT CHANGE UP
LIPID PNL WITH DIRECT LDL SERPL: 58 MG/DL — SIGNIFICANT CHANGE UP
NON HDL CHOLESTEROL: 75 MG/DL — SIGNIFICANT CHANGE UP
TRIGL SERPL-MCNC: 92 MG/DL — SIGNIFICANT CHANGE UP

## 2024-06-18 PROCEDURE — 99239 HOSP IP/OBS DSCHRG MGMT >30: CPT

## 2024-06-18 PROCEDURE — 93005 ELECTROCARDIOGRAM TRACING: CPT

## 2024-06-18 PROCEDURE — 85018 HEMOGLOBIN: CPT

## 2024-06-18 PROCEDURE — 85025 COMPLETE CBC W/AUTO DIFF WBC: CPT

## 2024-06-18 PROCEDURE — 36415 COLL VENOUS BLD VENIPUNCTURE: CPT

## 2024-06-18 PROCEDURE — 85014 HEMATOCRIT: CPT

## 2024-06-18 PROCEDURE — 70548 MR ANGIOGRAPHY NECK W/DYE: CPT

## 2024-06-18 PROCEDURE — 70553 MRI BRAIN STEM W/O & W/DYE: CPT | Mod: MC

## 2024-06-18 PROCEDURE — 80053 COMPREHEN METABOLIC PANEL: CPT

## 2024-06-18 PROCEDURE — 82803 BLOOD GASES ANY COMBINATION: CPT

## 2024-06-18 PROCEDURE — 84295 ASSAY OF SERUM SODIUM: CPT

## 2024-06-18 PROCEDURE — 85610 PROTHROMBIN TIME: CPT

## 2024-06-18 PROCEDURE — G0378: CPT

## 2024-06-18 PROCEDURE — 84484 ASSAY OF TROPONIN QUANT: CPT

## 2024-06-18 PROCEDURE — 82330 ASSAY OF CALCIUM: CPT

## 2024-06-18 PROCEDURE — 83605 ASSAY OF LACTIC ACID: CPT

## 2024-06-18 PROCEDURE — 82947 ASSAY GLUCOSE BLOOD QUANT: CPT

## 2024-06-18 PROCEDURE — A9585: CPT

## 2024-06-18 PROCEDURE — 80061 LIPID PANEL: CPT

## 2024-06-18 PROCEDURE — 84132 ASSAY OF SERUM POTASSIUM: CPT

## 2024-06-18 PROCEDURE — 99291 CRITICAL CARE FIRST HOUR: CPT | Mod: 25

## 2024-06-18 PROCEDURE — 99285 EMERGENCY DEPT VISIT HI MDM: CPT

## 2024-06-18 PROCEDURE — 71250 CT THORAX DX C-: CPT | Mod: MC

## 2024-06-18 PROCEDURE — 70450 CT HEAD/BRAIN W/O DYE: CPT | Mod: MC

## 2024-06-18 PROCEDURE — 70544 MR ANGIOGRAPHY HEAD W/O DYE: CPT

## 2024-06-18 PROCEDURE — 82435 ASSAY OF BLOOD CHLORIDE: CPT

## 2024-06-18 PROCEDURE — 83036 HEMOGLOBIN GLYCOSYLATED A1C: CPT

## 2024-06-18 PROCEDURE — 82962 GLUCOSE BLOOD TEST: CPT

## 2024-06-18 PROCEDURE — 72125 CT NECK SPINE W/O DYE: CPT | Mod: MC

## 2024-06-18 PROCEDURE — 85730 THROMBOPLASTIN TIME PARTIAL: CPT

## 2024-06-18 RX ORDER — LEVETIRACETAM 250 MG/1
1 TABLET, FILM COATED ORAL
Qty: 11 | Refills: 0
Start: 2024-06-18

## 2024-06-18 RX ORDER — AMILORIDE HCL 5 MG
5 TABLET ORAL ONCE
Refills: 0 | Status: DISCONTINUED | OUTPATIENT
Start: 2024-06-18 | End: 2024-06-20

## 2024-06-18 RX ADMIN — Medication 50 MICROGRAM(S): at 05:06

## 2024-06-18 RX ADMIN — LEVETIRACETAM 500 MILLIGRAM(S): 250 TABLET, FILM COATED ORAL at 11:21

## 2024-06-18 RX ADMIN — LOSARTAN POTASSIUM 50 MILLIGRAM(S): 100 TABLET, FILM COATED ORAL at 11:21

## 2024-06-18 NOTE — ED ADULT NURSE REASSESSMENT NOTE - NS ED NURSE REASSESS COMMENT FT1
13.00 Pt is evaluated by CDU MD Shahana Leigh   . pt is feeling better.  Pt is discharged . Ml out . BRODIE Olivares  explained the follow up care & handed over the discharge summary  . Pt has stable vitals steady gait A&OX 4 at the time of Discharge
18.00 Received the Pt from  RN Friend Eunice Pt is Observed for Head injury /unsteady Gait  for MRI  Received the Pt A&OX 4  Pt obeys commands Jaqueline N/V/D fever chills cp SOB   Comfort care & safety measures continued  IV site looks clean & dry no signs of infiltration noted pt denies  pain IV site .Pt is oriented to the unit Plan of care explained .  Pt is advised to call for help  call bell with in the reach pt verbalized the understanding .  pending CDU  MD gaona . GCS 15/15 A&OX 4 PERRLA  size 3 Strong upper & lower extremities steady gait  Pt is ambulatory with support Fall precautions are initiated   No facial droop  No Hand Leg drop denies numbness tingling  Plan of care ongoing
Report received from TIERA Hylton. Pt A&Ox3, IV intact and patent, VSS, pt denies pain/discomfort, bed locked and in lowest position, call bell within reach and pt instructed on use, safety and comfort measures maintained.
07.00 Received the Pt from  TIERA Cool. Pt is Observed for  head injury/ SDH  pending dispo.  MRI  resulted . Received the Pt A&OX 4  Pt obeys commands Jaqueline N/V/D fever chills cp SOB   Comfort care & safety measures continued  IV site looks clean & dry no signs of infiltration noted pt denies  pain IV site .Pt is oriented to the unit Plan of care explained .  Pt is advised to call for help  call bell with in the reach pt verbalized the understanding .  pending CDU  MD gaona . GCS 15/15 A&OX 4 PERRLA  size 3 Strong upper & lower extremities steady gait  Pt is ambulatory & independent   No facial droop  No Hand Leg drop denies numbness tingling .  Pt is sitting on the chair States he feels better  not in pain    Plan of care ongoing
Received pt from TIERA Purvis at 19:00hrs. Pt A&O x 4. Pt is observed in CDU for Fall, to R/O CVA. MRI done, awaiting result. Neuro check done. Patient on cardiac monitor, sinus elias, HR in 50's. Tylenol given for pain with good relief. Pt denies any chest pain, SOB, dizziness or palpitations. V/S stable, BL IV patent and free of signs of infiltration. OOB with minimal assist. Fall and safety precautions initiated and maintained. Educated patient to call for assistance as needed. Call bell in reach. Will continue to monitor.

## 2024-06-18 NOTE — ED CDU PROVIDER SUBSEQUENT DAY NOTE - HISTORY
CDU PROGRESS NOTE PA NEHAL: Pt resting comfortably, aox3, no focal neuro deficits, feeling well without complaint. NAD, VSS. small hematoma noted to the occiput, unchanged from prior exam. Will continue to monitor, MRI pending in am.

## 2024-06-18 NOTE — ED CDU PROVIDER SUBSEQUENT DAY NOTE - PROGRESS NOTE DETAILS
CDU NOTE BRODIE Hutchins: pt resting comfortably, feels well without complaint. denies dizziness, headache, vision change, numbness, weakness. NAD VSS. pt well appearing, steady gait without assistance.  awaiting Neuro attending evaluation. CDU NOTE BRODIE Hutchins: MRIs reviewed by Neuro and Neurosurgery. Neuro attending evaluated patient and is recommending d/c home without need for Keppra. I spoke with Neurosurgery team in regards to Keppra as well as calvarial lesion, Neurosurgery Resident Eunice spoke with her attending and came and spoke with patient. Pt to f/up outpatient with Neurosurgeon for both SDH and calvarial lesion. per Neurosurgery, still recommending Keppra for total 7 days. pt already received Keppra here x 1.5 days. will give remainder in prescription.

## 2024-06-18 NOTE — ED CDU PROVIDER SUBSEQUENT DAY NOTE - CLINICAL SUMMARY MEDICAL DECISION MAKING FREE TEXT BOX
Attending Note -- PA notes reviewed.  Please refer to CDU Disposition note from same day.  --Reese Harkins MD, Attending Physician

## 2024-06-18 NOTE — ED ADULT NURSE REASSESSMENT NOTE - NSFALLHARMRISKINTERV_ED_ALL_ED
Communicate risk of Fall with Harm to all staff, patient, and family/Provide visual cue: red socks, yellow wristband, yellow gown, etc/Reinforce activity limits and safety measures with patient and family/Bed in lowest position, wheels locked, appropriate side rails in place/Call bell, personal items and telephone in reach/Instruct patient to call for assistance before getting out of bed/chair/stretcher/Non-slip footwear applied when patient is off stretcher/[place] to call system/Physically safe environment - no spills, clutter or unnecessary equipment/Purposeful Proactive Rounding/Room/bathroom lighting operational, light cord in reach
Assistance OOB with selected safe patient handling equipment if applicable/Assistance with ambulation/Communicate risk of Fall with Harm to all staff, patient, and family/Provide visual cue: red socks, yellow wristband, yellow gown, etc/Reinforce activity limits and safety measures with patient and family/Bed in lowest position, wheels locked, appropriate side rails in place/Call bell, personal items and telephone in reach/Instruct patient to call for assistance before getting out of bed/chair/stretcher/Non-slip footwear applied when patient is off stretcher/[place] to call system/Physically safe environment - no spills, clutter or unnecessary equipment/Purposeful Proactive Rounding/Room/bathroom lighting operational, light cord in reach

## 2024-06-20 PROBLEM — E03.9 HYPOTHYROIDISM, UNSPECIFIED: Chronic | Status: ACTIVE | Noted: 2024-06-17

## 2024-06-20 PROBLEM — I10 ESSENTIAL (PRIMARY) HYPERTENSION: Chronic | Status: ACTIVE | Noted: 2024-06-17

## 2024-06-20 NOTE — REASON FOR VISIT
[Home] : at home, [unfilled] , at the time of the visit. [Medical Office: ([facility])___] : at the medical office located in  [Spouse] : spouse [Verbal consent obtained from patient] : the patient, [unfilled] [Post Hospitalization] : a post hospitalization visit

## 2024-06-24 ENCOUNTER — APPOINTMENT (OUTPATIENT)
Dept: NEUROSURGERY | Facility: CLINIC | Age: 89
End: 2024-06-24
Payer: COMMERCIAL

## 2024-06-24 DIAGNOSIS — S06.5XAA TRAUMATIC SUBDURAL HEMORRHAGE WITH LOSS OF CONSCIOUSNESS STATUS UNKNOWN, INITIAL ENCOUNTER: ICD-10-CM

## 2024-06-24 PROCEDURE — 99442: CPT

## 2024-06-24 NOTE — DATA REVIEWED
[de-identified] : MRI brain w/wo IV contrast 6/17/24 [de-identified] : MRA head 6/17/24, MRA neck w/ IV contrast 6/17/24

## 2024-06-24 NOTE — HISTORY OF PRESENT ILLNESS
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 syncope. CTH showed small acute right parafalcine subdural hematoma. No mass effect. Managed conservatively. Prescribed Keppra 500mg BID for 7 days for seizure prophylaxis.

## 2024-06-29 ENCOUNTER — OUTPATIENT (OUTPATIENT)
Dept: OUTPATIENT SERVICES | Facility: HOSPITAL | Age: 89
LOS: 1 days | End: 2024-06-29
Payer: COMMERCIAL

## 2024-06-29 ENCOUNTER — APPOINTMENT (OUTPATIENT)
Dept: CT IMAGING | Facility: CLINIC | Age: 89
End: 2024-06-29
Payer: COMMERCIAL

## 2024-06-29 DIAGNOSIS — S06.5XAA TRAUMATIC SUBDURAL HEMORRHAGE WITH LOSS OF CONSCIOUSNESS STATUS UNKNOWN, INITIAL ENCOUNTER: ICD-10-CM

## 2024-06-29 PROCEDURE — 70450 CT HEAD/BRAIN W/O DYE: CPT | Mod: 26

## 2024-06-29 PROCEDURE — 70450 CT HEAD/BRAIN W/O DYE: CPT

## 2024-07-02 ENCOUNTER — NON-APPOINTMENT (OUTPATIENT)
Age: 89
End: 2024-07-02

## 2024-07-03 ENCOUNTER — APPOINTMENT (OUTPATIENT)
Dept: NEUROSURGERY | Facility: CLINIC | Age: 89
End: 2024-07-03
Payer: COMMERCIAL

## 2024-07-03 ENCOUNTER — NON-APPOINTMENT (OUTPATIENT)
Age: 89
End: 2024-07-03

## 2024-07-03 VITALS
BODY MASS INDEX: 29.4 KG/M2 | WEIGHT: 168 LBS | TEMPERATURE: 97.9 F | DIASTOLIC BLOOD PRESSURE: 55 MMHG | HEART RATE: 77 BPM | SYSTOLIC BLOOD PRESSURE: 151 MMHG | OXYGEN SATURATION: 98 % | HEIGHT: 63.5 IN

## 2024-07-03 DIAGNOSIS — S06.5XAA TRAUMATIC SUBDURAL HEMORRHAGE WITH LOSS OF CONSCIOUSNESS STATUS UNKNOWN, INITIAL ENCOUNTER: ICD-10-CM

## 2024-07-03 DIAGNOSIS — I67.2 CEREBRAL ATHEROSCLEROSIS: ICD-10-CM

## 2024-07-03 PROCEDURE — 99215 OFFICE O/P EST HI 40 MIN: CPT

## 2024-07-22 NOTE — ASSESSMENT
Refusal of blood transfusion as patient is Judaism   -chronic, controlled   -hemoglobin 10, hematocrit 29 at admission   -no overt signs or symptoms of bleeding   -trend CBC over hospital course     04/29/24 15:03   Iron 75   TIBC 353   Saturated Iron 21   Transferrin 252   Ferritin 311 (H)   Vitamin B12 >2000 (H)      [FreeTextEntry1] : IMPRESSION:  89M with PMH of HTN, migraines, BPPV, no prior AC/AP, p/w mechanical fall/syncopal fall 6/17/24. CTH showed small acute R parafalcine SDH, managed conservatively. Keppra 500mg BID x7 days for seizure ppx.  Patient states he is doing well. Denies headaches, unsteadiness, and other symptoms of motor, sensory, speech, or visual abnormalities.    PLAN: Patient has CT head non con scheduled for 6/29/24 Follow up with Dr. Cevallos in office on 7/3/24

## 2024-07-27 ENCOUNTER — APPOINTMENT (OUTPATIENT)
Dept: CT IMAGING | Facility: CLINIC | Age: 89
End: 2024-07-27
Payer: COMMERCIAL

## 2024-07-27 PROCEDURE — 70450 CT HEAD/BRAIN W/O DYE: CPT | Mod: 26

## 2024-07-29 ENCOUNTER — TRANSCRIPTION ENCOUNTER (OUTPATIENT)
Age: 89
End: 2024-07-29

## 2024-07-31 ENCOUNTER — APPOINTMENT (OUTPATIENT)
Dept: NEUROSURGERY | Facility: CLINIC | Age: 89
End: 2024-07-31

## 2024-07-31 DIAGNOSIS — S06.5XAA TRAUMATIC SUBDURAL HEMORRHAGE WITH LOSS OF CONSCIOUSNESS STATUS UNKNOWN, INITIAL ENCOUNTER: ICD-10-CM

## 2024-07-31 DIAGNOSIS — I67.2 CEREBRAL ATHEROSCLEROSIS: ICD-10-CM

## 2024-07-31 PROCEDURE — 99442: CPT

## 2024-07-31 NOTE — REASON FOR VISIT
[Home] : at home, [unfilled] , at the time of the visit. [Medical Office: ([facility])___] : at the medical office located in  [Verbal consent obtained from patient] : the patient, [unfilled] [Follow-Up: _____] : a [unfilled] follow-up visit [FreeTextEntry1] : Reviewed repeat outpatient CT head non con done 7/27/24

## 2024-07-31 NOTE — ASSESSMENT
[FreeTextEntry1] : IMPRESSION: 89M with PMH of HTN, migraines, BPPV, no prior AC/AP, p/w mechanical fall/syncopal fall 6/17/24. CTH showed small acute R parafalcine SDH, managed conservatively. Keppra 500mg BID x7 days for seizure ppx. Mild to moderate intracranial atherosclerotic disease, as expected for age, not on ASA.  Repeat CT head 7/27/24 shows resolution of previously seen parafalcine subdural hematoma.  Patient continues to do clinically well. No headaches, unsteadiness, and other symptoms of motor, sensory, speech, or visual abnormalities.   PLAN: No repeat imaging from a neurosurgical standpoint regarding the subdural hematoma unless new concerns arise Follow up with neurology for consideration of ASA for mild-moderate intracranial athero

## 2024-07-31 NOTE — REASON FOR VISIT
[Home] : at home, [unfilled] , at the time of the visit. [Medical Office: ([facility])___] : at the medical office located in  [Verbal consent obtained from patient] : the patient, [unfilled] [Follow-Up: _____] : a [unfilled] follow-up visit [FreeTextEntry1] : Reviewed repeat outpatient CT head non con done 7/27/24

## 2024-07-31 NOTE — HISTORY OF PRESENT ILLNESS
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 syncope. CTH showed small acute right parafalcine subdural hematoma. No mass effect. Managed conservatively. Prescribed Keppra 500mg BID for 7 days for seizure prophylaxis. MRA with mild to moderate intracranial atherosclerotic disease, not currently on ASA.  Today, patient presents for follow up phone call to review results of repeat CT head obtained on 7/27/24. No new neurologic complaints since our last visit.

## 2024-07-31 NOTE — HISTORY OF PRESENT ILLNESS
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 syncope. CTH showed small acute right parafalcine subdural hematoma. No mass effect. Managed conservatively. Prescribed Keppra 500mg BID for 7 days for seizure prophylaxis. MRA with mild to moderate intracranial atherosclerotic disease, not currently on ASA.  Today, patient presents for follow up phone call to review results of repeat CT head obtained on 7/27/24. No new neurologic complaints since our last visit.

## 2025-08-05 ENCOUNTER — NON-APPOINTMENT (OUTPATIENT)
Age: 89
End: 2025-08-05

## 2025-08-07 ENCOUNTER — NON-APPOINTMENT (OUTPATIENT)
Age: 89
End: 2025-08-07

## 2025-08-07 ENCOUNTER — APPOINTMENT (OUTPATIENT)
Dept: UROLOGY | Facility: CLINIC | Age: 89
End: 2025-08-07
Payer: COMMERCIAL

## 2025-08-07 VITALS
HEIGHT: 63.5 IN | OXYGEN SATURATION: 98 % | RESPIRATION RATE: 16 BRPM | BODY MASS INDEX: 29.4 KG/M2 | HEART RATE: 65 BPM | TEMPERATURE: 97.2 F | DIASTOLIC BLOOD PRESSURE: 75 MMHG | WEIGHT: 168 LBS | SYSTOLIC BLOOD PRESSURE: 172 MMHG

## 2025-08-07 DIAGNOSIS — C67.9 MALIGNANT NEOPLASM OF BLADDER, UNSPECIFIED: ICD-10-CM

## 2025-08-07 PROCEDURE — 99244 OFF/OP CNSLTJ NEW/EST MOD 40: CPT

## 2025-08-09 LAB
APPEARANCE: CLEAR
BACTERIA: NEGATIVE /HPF
BILIRUBIN URINE: NEGATIVE
BLOOD URINE: NEGATIVE
CAST: 0 /LPF
COLOR: YELLOW
EPITHELIAL CELLS: 0 /HPF
GLUCOSE QUALITATIVE U: NEGATIVE MG/DL
KETONES URINE: NEGATIVE MG/DL
LEUKOCYTE ESTERASE URINE: NEGATIVE
MICROSCOPIC-UA: NORMAL
NITRITE URINE: NEGATIVE
PH URINE: 7
PROTEIN URINE: NEGATIVE MG/DL
RED BLOOD CELLS URINE: 0 /HPF
SPECIFIC GRAVITY URINE: 1.01
UROBILINOGEN URINE: 0.2 MG/DL
WHITE BLOOD CELLS URINE: 0 /HPF

## 2025-08-10 LAB — BACTERIA UR CULT: NORMAL

## 2025-08-11 LAB — URINE CYTOLOGY: NORMAL

## 2025-08-22 ENCOUNTER — OUTPATIENT (OUTPATIENT)
Dept: OUTPATIENT SERVICES | Facility: HOSPITAL | Age: 89
LOS: 1 days | End: 2025-08-22

## 2025-08-22 VITALS
SYSTOLIC BLOOD PRESSURE: 171 MMHG | TEMPERATURE: 98 F | HEIGHT: 62 IN | DIASTOLIC BLOOD PRESSURE: 89 MMHG | WEIGHT: 175.05 LBS | HEART RATE: 63 BPM | RESPIRATION RATE: 16 BRPM | OXYGEN SATURATION: 98 %

## 2025-08-22 DIAGNOSIS — C67.9 MALIGNANT NEOPLASM OF BLADDER, UNSPECIFIED: ICD-10-CM

## 2025-08-22 DIAGNOSIS — I10 ESSENTIAL (PRIMARY) HYPERTENSION: ICD-10-CM

## 2025-08-22 DIAGNOSIS — Z98.49 CATARACT EXTRACTION STATUS, UNSPECIFIED EYE: Chronic | ICD-10-CM

## 2025-08-22 DIAGNOSIS — Z98.890 OTHER SPECIFIED POSTPROCEDURAL STATES: Chronic | ICD-10-CM

## 2025-08-22 DIAGNOSIS — E03.9 HYPOTHYROIDISM, UNSPECIFIED: ICD-10-CM

## 2025-09-05 ENCOUNTER — APPOINTMENT (OUTPATIENT)
Dept: UROLOGY | Facility: HOSPITAL | Age: 89
End: 2025-09-05

## 2025-09-05 ENCOUNTER — TRANSCRIPTION ENCOUNTER (OUTPATIENT)
Age: 89
End: 2025-09-05

## 2025-09-05 ENCOUNTER — OUTPATIENT (OUTPATIENT)
Dept: OUTPATIENT SERVICES | Facility: HOSPITAL | Age: 89
LOS: 1 days | End: 2025-09-05
Payer: COMMERCIAL

## 2025-09-05 ENCOUNTER — RESULT REVIEW (OUTPATIENT)
Age: 89
End: 2025-09-05

## 2025-09-05 VITALS
SYSTOLIC BLOOD PRESSURE: 165 MMHG | WEIGHT: 175.05 LBS | DIASTOLIC BLOOD PRESSURE: 69 MMHG | TEMPERATURE: 98 F | HEIGHT: 62 IN | RESPIRATION RATE: 16 BRPM | HEART RATE: 66 BPM | OXYGEN SATURATION: 98 %

## 2025-09-05 VITALS
SYSTOLIC BLOOD PRESSURE: 107 MMHG | HEART RATE: 57 BPM | DIASTOLIC BLOOD PRESSURE: 61 MMHG | OXYGEN SATURATION: 97 % | RESPIRATION RATE: 16 BRPM

## 2025-09-05 DIAGNOSIS — Z98.890 OTHER SPECIFIED POSTPROCEDURAL STATES: Chronic | ICD-10-CM

## 2025-09-05 DIAGNOSIS — C67.9 MALIGNANT NEOPLASM OF BLADDER, UNSPECIFIED: ICD-10-CM

## 2025-09-05 DIAGNOSIS — Z98.49 CATARACT EXTRACTION STATUS, UNSPECIFIED EYE: Chronic | ICD-10-CM

## 2025-09-05 PROCEDURE — 88112 CYTOPATH CELL ENHANCE TECH: CPT | Mod: 26

## 2025-09-05 PROCEDURE — 88307 TISSUE EXAM BY PATHOLOGIST: CPT | Mod: 26

## 2025-09-05 PROCEDURE — 52240 CYSTOSCOPY AND TREATMENT: CPT

## 2025-09-05 RX ORDER — LOSARTAN POTASSIUM 100 MG/1
1 TABLET, FILM COATED ORAL
Refills: 0 | DISCHARGE

## 2025-09-05 RX ORDER — HYDROMORPHONE/SOD CHLOR,ISO/PF 2 MG/10 ML
0.5 SYRINGE (ML) INJECTION
Refills: 0 | Status: DISCONTINUED | OUTPATIENT
Start: 2025-09-05 | End: 2025-09-05

## 2025-09-05 RX ORDER — AMPICILLIN SODIUM 1 G/1
1 INJECTION, POWDER, FOR SOLUTION INTRAMUSCULAR; INTRAVENOUS
Qty: 15 | Refills: 0
Start: 2025-09-05 | End: 2025-09-09

## 2025-09-05 RX ORDER — OXYBUTYNIN CHLORIDE 5 MG/1
5 TABLET, FILM COATED, EXTENDED RELEASE ORAL ONCE
Refills: 0 | Status: COMPLETED | OUTPATIENT
Start: 2025-09-05 | End: 2025-09-05

## 2025-09-05 RX ORDER — CITRIC ACID/SODIUM CITRATE 300-500 MG
15 SOLUTION, ORAL ORAL
Qty: 0 | Refills: 0 | DISCHARGE

## 2025-09-05 RX ORDER — SODIUM CHLORIDE 9 G/1000ML
1000 INJECTION, SOLUTION INTRAVENOUS
Refills: 0 | Status: ACTIVE | OUTPATIENT
Start: 2025-09-05 | End: 2026-08-04

## 2025-09-05 RX ORDER — ATORVASTATIN CALCIUM 80 MG/1
0.5 TABLET, FILM COATED ORAL
Qty: 0 | Refills: 0 | DISCHARGE
Start: 2025-09-05

## 2025-09-05 RX ORDER — HYDROCHLOROTHIAZIDE 50 MG/1
1 TABLET ORAL
Qty: 0 | Refills: 0 | DISCHARGE
Start: 2025-09-05

## 2025-09-05 RX ORDER — AMILORIDE HYDROCHLORIDE 5 MG/1
1 TABLET ORAL
Refills: 0 | DISCHARGE

## 2025-09-05 RX ORDER — ONDANSETRON HCL/PF 4 MG/2 ML
4 VIAL (ML) INJECTION ONCE
Refills: 0 | Status: ACTIVE | OUTPATIENT
Start: 2025-09-05 | End: 2026-08-04

## 2025-09-05 RX ORDER — CIMETIDINE 400 MG
1 TABLET ORAL
Refills: 0 | DISCHARGE

## 2025-09-05 RX ORDER — LOSARTAN POTASSIUM 100 MG/1
1 TABLET, FILM COATED ORAL
Qty: 0 | Refills: 0 | DISCHARGE
Start: 2025-09-05

## 2025-09-05 RX ORDER — LEVOTHYROXINE SODIUM 300 MCG
1 TABLET ORAL
Qty: 0 | Refills: 0 | DISCHARGE
Start: 2025-09-05

## 2025-09-05 RX ORDER — TAMSULOSIN HYDROCHLORIDE 0.4 MG/1
1 CAPSULE ORAL
Qty: 60 | Refills: 0
Start: 2025-09-05 | End: 2025-10-04

## 2025-09-05 RX ORDER — LEVOTHYROXINE SODIUM 300 MCG
1 TABLET ORAL
Refills: 0 | DISCHARGE

## 2025-09-05 RX ORDER — AMILORIDE HYDROCHLORIDE 5 MG/1
1 TABLET ORAL
Qty: 0 | Refills: 0 | DISCHARGE
Start: 2025-09-05

## 2025-09-05 RX ORDER — ATORVASTATIN CALCIUM 80 MG/1
0.5 TABLET, FILM COATED ORAL
Refills: 0 | DISCHARGE

## 2025-09-05 RX ORDER — HYDROCHLOROTHIAZIDE 50 MG/1
1 TABLET ORAL
Refills: 0 | DISCHARGE

## 2025-09-05 RX ORDER — TAMSULOSIN HYDROCHLORIDE 0.4 MG/1
0.4 CAPSULE ORAL ONCE
Refills: 0 | Status: COMPLETED | OUTPATIENT
Start: 2025-09-05 | End: 2025-09-05

## 2025-09-05 RX ADMIN — TAMSULOSIN HYDROCHLORIDE 0.4 MILLIGRAM(S): 0.4 CAPSULE ORAL at 15:27

## 2025-09-05 RX ADMIN — Medication 0.5 MILLIGRAM(S): at 12:17

## 2025-09-05 RX ADMIN — OXYBUTYNIN CHLORIDE 5 MILLIGRAM(S): 5 TABLET, FILM COATED, EXTENDED RELEASE ORAL at 14:46

## 2025-09-06 LAB
CULTURE RESULTS: NO GROWTH — SIGNIFICANT CHANGE UP
SPECIMEN SOURCE: SIGNIFICANT CHANGE UP

## 2025-09-09 LAB
NON-GYNECOLOGICAL CYTOLOGY STUDY: SIGNIFICANT CHANGE UP
SURGICAL PATHOLOGY STUDY: SIGNIFICANT CHANGE UP

## 2025-09-10 PROBLEM — N20.0 CALCULUS OF KIDNEY: Chronic | Status: ACTIVE | Noted: 2025-08-22

## 2025-09-10 PROBLEM — Z86.79 PERSONAL HISTORY OF OTHER DISEASES OF THE CIRCULATORY SYSTEM: Chronic | Status: ACTIVE | Noted: 2025-08-22

## 2025-09-10 PROBLEM — N28.1 CYST OF KIDNEY, ACQUIRED: Chronic | Status: ACTIVE | Noted: 2025-08-22

## 2025-09-10 PROBLEM — Z87.448 PERSONAL HISTORY OF OTHER DISEASES OF URINARY SYSTEM: Chronic | Status: ACTIVE | Noted: 2025-08-22

## 2025-09-10 PROBLEM — Z87.19 PERSONAL HISTORY OF OTHER DISEASES OF THE DIGESTIVE SYSTEM: Chronic | Status: ACTIVE | Noted: 2025-08-22

## 2025-09-10 PROBLEM — Z87.438 PERSONAL HISTORY OF OTHER DISEASES OF MALE GENITAL ORGANS: Chronic | Status: ACTIVE | Noted: 2025-08-22

## 2025-09-10 PROBLEM — E04.2 NONTOXIC MULTINODULAR GOITER: Chronic | Status: ACTIVE | Noted: 2025-08-22

## 2025-09-16 LAB
APPEARANCE: CLEAR
BACTERIA UR CULT: NORMAL
BACTERIA: NEGATIVE /HPF
BILIRUBIN URINE: NEGATIVE
BLOOD URINE: ABNORMAL
CAST: 0 /LPF
COLOR: YELLOW
EPITHELIAL CELLS: 0 /HPF
GLUCOSE QUALITATIVE U: NEGATIVE MG/DL
HCT VFR BLD CALC: 41.7 %
HGB BLD-MCNC: 13.8 G/DL
KETONES URINE: NEGATIVE MG/DL
LEUKOCYTE ESTERASE URINE: NEGATIVE
MCHC RBC-ENTMCNC: 27.5 PG
MCHC RBC-ENTMCNC: 33.1 G/DL
MCV RBC AUTO: 83.1 FL
MICROSCOPIC-UA: NORMAL
NITRITE URINE: NEGATIVE
PH URINE: 7.5
PLATELET # BLD AUTO: 214 K/UL
PROTEIN URINE: NEGATIVE MG/DL
RBC # BLD: 5.02 M/UL
RBC # FLD: 13.4 %
RED BLOOD CELLS URINE: 1 /HPF
SPECIFIC GRAVITY URINE: 1.01
UROBILINOGEN URINE: 0.2 MG/DL
WBC # FLD AUTO: 6.72 K/UL
WHITE BLOOD CELLS URINE: 0 /HPF

## 2025-09-17 LAB
CULTURE RESULTS: SIGNIFICANT CHANGE UP
SPECIMEN SOURCE: SIGNIFICANT CHANGE UP

## (undated) DEVICE — TUBING THERMADX UROLOGY

## (undated) DEVICE — FOLEY CATH 2-WAY 20F 5CC LATEX LUBRICATH

## (undated) DEVICE — ELCTR GROUNDING PAD ADULT COVIDIEN

## (undated) DEVICE — SOL IRR GLYCINE 1.5% 3000L

## (undated) DEVICE — SOL IRR BAG H2O 3000ML

## (undated) DEVICE — GLV 8 PROTEXIS (CREAM) MICRO

## (undated) DEVICE — WARMING BLANKET UPPER ADULT

## (undated) DEVICE — SOL IRR POUR H2O 500ML

## (undated) DEVICE — VENODYNE/SCD SLEEVE CALF MEDIUM

## (undated) DEVICE — PACK CYSTO

## (undated) DEVICE — TUBING LEVEL ONE NORMOFLO SET

## (undated) DEVICE — ELCTR HF RESECTION LOOP 24FR 30 DEG 0.35 WIRE

## (undated) DEVICE — SYR TOOMEY 50ML

## (undated) DEVICE — BAR ROLLER FOR ELITE ELCTR

## (undated) DEVICE — CABLE DAC ACTIVE CORD

## (undated) DEVICE — GLV 7.5 PROTEXIS (CREAM) MICRO

## (undated) DEVICE — POSITIONER STRAP ARMBOARD VELCRO TS-30

## (undated) DEVICE — CANISTER DISPOSABLE THIN WALL 3000CC

## (undated) DEVICE — BAG URINE W METER 2L